# Patient Record
Sex: FEMALE | Race: WHITE | URBAN - METROPOLITAN AREA
[De-identification: names, ages, dates, MRNs, and addresses within clinical notes are randomized per-mention and may not be internally consistent; named-entity substitution may affect disease eponyms.]

---

## 2021-11-03 ENCOUNTER — OFFICE VISIT (OUTPATIENT)
Dept: SURGERY | Age: 56
End: 2021-11-03
Payer: COMMERCIAL

## 2021-11-03 VITALS
DIASTOLIC BLOOD PRESSURE: 50 MMHG | OXYGEN SATURATION: 95 % | BODY MASS INDEX: 20.1 KG/M2 | HEIGHT: 68 IN | TEMPERATURE: 98.6 F | HEART RATE: 71 BPM | RESPIRATION RATE: 18 BRPM | WEIGHT: 132.6 LBS | SYSTOLIC BLOOD PRESSURE: 91 MMHG

## 2021-11-03 DIAGNOSIS — I89.8 CALCIFIED LYMPH NODES: Primary | ICD-10-CM

## 2021-11-03 PROCEDURE — 99202 OFFICE O/P NEW SF 15 MIN: CPT | Performed by: SURGERY

## 2021-11-03 RX ORDER — IBUPROFEN 200 MG
200 TABLET ORAL
COMMUNITY

## 2021-11-03 NOTE — LETTER
11/3/2021 Patient: Jaya Levy YOB: 1965 Date of Visit: 11/3/2021 Leonie Rae MD 
[address] [address] Via Fax: [phone] Dear Leonie Rae MD, Thank you for referring Ms. Jaya Levy to [facility] for evaluation. My notes for this consultation are attached. If you have questions, please do not hesitate to call me. I look forward to following your patient along with you. Sincerely, Siddharth Moscoso MD

## 2021-11-03 NOTE — PROGRESS NOTES
1. Have you been to the ER, urgent care clinic since your last visit? Hospitalized since your last visit? no    2. Have you seen or consulted any other health care providers outside of the [address] [place] [address] since your last visit? Include any pap smears or colon screening.  Dr. Emory Dove Urologist. 10/21/21

## 2021-11-03 NOTE — PROGRESS NOTES
Subjective:      Benita Mensah  is a 64 y.o. female referred by Dr. Racquel Cramer for evaluation of calcified mesenteric LN. Pt had CT renal mass protocol on 10/13/21 that showed indeterminate partially calcified LN in the mesentery measuring 9 x 11mm. Finding is nonspecific, however metastatic carcinoid tumor could have this appearance. CT abd pel with IV contrast on 10/28/21 showed no change in patially calcified mesenteric LN. No other mass is identified in the abdomen or pelvis. This remains indeterminate and could be related to prior granulomatous infection. However, metastatic carcinoid tumor is in differential diagnosis. Today, pt denies any symptoms. Pt brought in disc of images for review. Past Medical History:   Diagnosis Date    Calcified lymph nodes, mesenteric 11/3/2021       Past Surgical History:   Procedure Laterality Date    HX HYSTERECTOMY  2008       Social History     Tobacco Use    Smoking status: Current Every Day Smoker    Smokeless tobacco: Never Used   Substance Use Topics    Alcohol use: Yes     Comment: rarely       Family History   Problem Relation Age of Onset    Colon Cancer Father     Cancer Sister     Breast Cancer Sister     Cancer Maternal Grandmother     Breast Cancer Maternal Grandmother     Uterine Cancer Paternal Grandmother        Current Outpatient Medications on File Prior to Visit   Medication Sig Dispense Refill    ibuprofen (AdviL) 200 mg tablet Take 200 mg by mouth every six (6) hours as needed for Pain.  MULTIVITAMIN PO Take  by mouth. No current facility-administered medications on file prior to visit.        No Known Allergies      Review of Systems:  Constitutional: No fever or chills  Neurologic: No headache  Eyes: No scleral icterus or irritated eyes  Nose: No nasal pain or drainage  Mouth: No oral lesions or sore throat  Cardiac: No palpations or chest pain  Pulmonary: No cough or shortness or breath  Gastrointestinal: No nausea, emesis, diarrhea, or constipation  Genitourinary: No dysuria  Musculoskeletal: No muscle or joint tenderness  Skin: No rashes or lesions  Psychiatric: No anxiety or depressed mood    Objective:     Visit Vitals  BP (!) 91/50   Pulse 71   Temp 98.6 °F (37 °C) (Oral)   Resp 18   Ht 5' 8\" (1.727 m)   Wt 132 lb 9.6 oz (60.1 kg)   SpO2 95%   BMI 20.16 kg/m²        Physical Exam:  General: No acute distress, conversant  Eyes: PERRLA, no scleral icterus  HENT: Normocephalic without oral lesions  Neck: Trachea midline without LAD  Cardiac: Normal pulse rate and rhythm  Pulmonary: Symmetric chest rise with normal effort  Abdomen: Soft, NT, ND, no hernia, no splenomegaly  Skin: Warm without rash  Extremities: No edema or joint stiffness  Psych: Appropriate mood and affect    Labs: No results found for this or any previous visit (from the past 24 hour(s)). Data Review: All previous imaging, testing and lab work was reviewed and interpreted. Assessment and Plan:       ICD-10-CM ICD-9-CM    1. Calcified lymph nodes, mesenteric  I89.8 457.8        Images reviewed. Pt does not have any symptoms concerning for metastatic carcinoid. Recommend observation at this time. If pt notices any changes she should return for re-evaluation, otherwise F/U PRN. All questions were answered and pt is in agreement with this plan. Total face to face time with patient: 15 minutes. Greater than 50% of the time was spent in counseling. This document was scribed by Rajesh Brown as dictated by Dr. Justin Mac.      Signed By: Bon Irwin MD     11/03/21

## 2022-03-18 PROBLEM — I89.8 CALCIFIED LYMPH NODES: Status: ACTIVE | Noted: 2021-11-03

## 2023-05-15 RX ORDER — IBUPROFEN 200 MG
200 TABLET ORAL EVERY 6 HOURS PRN
COMMUNITY